# Patient Record
Sex: FEMALE | Employment: UNEMPLOYED | URBAN - METROPOLITAN AREA
[De-identification: names, ages, dates, MRNs, and addresses within clinical notes are randomized per-mention and may not be internally consistent; named-entity substitution may affect disease eponyms.]

---

## 2017-05-07 ENCOUNTER — HOSPITAL ENCOUNTER (OUTPATIENT)
Age: 32
Discharge: HOME | End: 2017-05-07
Payer: COMMERCIAL

## 2017-05-07 VITALS
DIASTOLIC BLOOD PRESSURE: 65 MMHG | HEIGHT: 62 IN | SYSTOLIC BLOOD PRESSURE: 99 MMHG | TEMPERATURE: 98 F | WEIGHT: 130 LBS | BODY MASS INDEX: 23.92 KG/M2 | RESPIRATION RATE: 20 BRPM | HEART RATE: 78 BPM | OXYGEN SATURATION: 99 %

## 2017-05-07 DIAGNOSIS — T30.0 SUPERFICIAL BURN: Primary | ICD-10-CM

## 2017-05-07 PROCEDURE — 99202 OFFICE O/P NEW SF 15 MIN: CPT

## 2017-05-07 NOTE — ED PROVIDER NOTES
Patient Seen in: [address]    History   Patient presents with:  Trauma (cardiovascular, musculoskeletal)    Stated Complaint: right hand burn     HPI    27year old female complains of burning right dorsal hand and wrist Eyes: Conjunctivae and lids are normal. Right conjunctiva is not injected. Left conjunctiva is not injected. No scleral icterus. Pupils are equal.   Neck: Normal range of motion and phonation normal. Neck supple. No JVD present.    Pulmonary/Chest: Effort n Disposition and Plan     Clinical Impression:  Superficial burn  (primary encounter diagnosis)    Disposition:  Discharge    Follow-up:      Schedule an appointment as soon as possible for a visit      MD Amos Moura 13  [address] [place] [address]

## 2017-09-25 PROCEDURE — 88175 CYTOPATH C/V AUTO FLUID REDO: CPT | Performed by: OBSTETRICS & GYNECOLOGY

## 2020-06-08 ENCOUNTER — OFFICE VISIT (OUTPATIENT)
Dept: OBGYN CLINIC | Facility: CLINIC | Age: 35
End: 2020-06-08
Payer: COMMERCIAL

## 2020-06-08 VITALS
SYSTOLIC BLOOD PRESSURE: 102 MMHG | TEMPERATURE: 98 F | HEART RATE: 76 BPM | DIASTOLIC BLOOD PRESSURE: 78 MMHG | HEIGHT: 62 IN | RESPIRATION RATE: 14 BRPM | BODY MASS INDEX: 22.26 KG/M2 | WEIGHT: 121 LBS

## 2020-06-08 DIAGNOSIS — Z32.00 ENCOUNTER FOR PREGNANCY TEST, RESULT UNKNOWN: ICD-10-CM

## 2020-06-08 DIAGNOSIS — Z64.0 UNWANTED PREGNANCY WITH PLANS FOR TERMINATION: Primary | ICD-10-CM

## 2020-06-08 DIAGNOSIS — Z30.09 EVALUATION REGARDING CONTRACEPTION OPTIONS: ICD-10-CM

## 2020-06-08 PROCEDURE — 99202 OFFICE O/P NEW SF 15 MIN: CPT | Performed by: OBSTETRICS & GYNECOLOGY

## 2020-06-08 PROCEDURE — 81025 URINE PREGNANCY TEST: CPT | Performed by: OBSTETRICS & GYNECOLOGY

## 2020-06-09 NOTE — PROGRESS NOTES
HPI:   Gracie Barker is a 38year old female presents for consultation regarding  Termination of pregnancy. She and  has already decided. LMP 5/12/20. Unsure. By LMP is 5+ weeks pregnant. No spotting or cramping.  Considering [facility] exactly [facility]    3. Evaluation regarding contraception options  IUD, vasectomy nexplanon depo       Plan:  F/U after termination.

## 2020-10-05 ENCOUNTER — OFFICE VISIT (OUTPATIENT)
Dept: OBGYN CLINIC | Facility: CLINIC | Age: 35
End: 2020-10-05
Payer: COMMERCIAL

## 2020-10-05 VITALS
DIASTOLIC BLOOD PRESSURE: 50 MMHG | WEIGHT: 125 LBS | HEIGHT: 62 IN | SYSTOLIC BLOOD PRESSURE: 98 MMHG | BODY MASS INDEX: 23 KG/M2

## 2020-10-05 DIAGNOSIS — N89.8 VAGINAL DISCHARGE: ICD-10-CM

## 2020-10-05 DIAGNOSIS — N89.8 VAGINAL ITCHING: Primary | ICD-10-CM

## 2020-10-05 PROCEDURE — 3078F DIAST BP <80 MM HG: CPT | Performed by: OBSTETRICS & GYNECOLOGY

## 2020-10-05 PROCEDURE — 87660 TRICHOMONAS VAGIN DIR PROBE: CPT | Performed by: OBSTETRICS & GYNECOLOGY

## 2020-10-05 PROCEDURE — 3074F SYST BP LT 130 MM HG: CPT | Performed by: OBSTETRICS & GYNECOLOGY

## 2020-10-05 PROCEDURE — 99213 OFFICE O/P EST LOW 20 MIN: CPT | Performed by: OBSTETRICS & GYNECOLOGY

## 2020-10-05 PROCEDURE — 87510 GARDNER VAG DNA DIR PROBE: CPT | Performed by: OBSTETRICS & GYNECOLOGY

## 2020-10-05 PROCEDURE — 3008F BODY MASS INDEX DOCD: CPT | Performed by: OBSTETRICS & GYNECOLOGY

## 2020-10-05 PROCEDURE — 87480 CANDIDA DNA DIR PROBE: CPT | Performed by: OBSTETRICS & GYNECOLOGY

## 2020-10-05 NOTE — PROGRESS NOTES
HPI:    Patient ID: Pedro Kerns is a 38year old female. HPI  Patient presents with vulvar itching and vaginal discharge that she finds around the urethral area. She is  in denies possibility of STDs.   She has not used any new products in the [employer] can use over-the-counter Monistat and apply it to the external genitalia. Will wait for the culture to come back to assess type of infection she may have.   Meds This Visit:  Requested Prescriptions      No prescriptions requested or ordered in this encoun

## 2020-10-06 RX ORDER — METRONIDAZOLE 500 MG/1
500 TABLET ORAL 2 TIMES DAILY
Qty: 14 TABLET | Refills: 0 | Status: SHIPPED | OUTPATIENT
Start: 2020-10-06 | End: 2020-10-13

## 2020-10-16 PROBLEM — Z64.0 UNWANTED PREGNANCY WITH PLANS FOR TERMINATION: Status: RESOLVED | Noted: 2020-06-08 | Resolved: 2020-10-16

## 2020-10-16 PROBLEM — Z30.09 EVALUATION REGARDING CONTRACEPTION OPTIONS: Status: RESOLVED | Noted: 2020-06-08 | Resolved: 2020-10-16

## 2021-04-15 PROBLEM — M54.32 SCIATICA, LEFT SIDE: Status: ACTIVE | Noted: 2021-04-15

## 2021-04-15 PROBLEM — M94.0 COSTOCHONDRITIS: Status: ACTIVE | Noted: 2021-04-15

## 2021-04-15 PROBLEM — R07.9 CHEST PAIN, UNSPECIFIED TYPE: Status: ACTIVE | Noted: 2021-04-15

## (undated) NOTE — ED AVS SNAPSHOT
[facility] in [address] [address]    Phone:  [phone]    Fax:  [phone]           Chester Yung   MRN: [id number]    Department:  [facility] in [facility]   Date of Visit:  5/7/201 aspect of your visit today. In an effort to constantly improve our service to you, we would appreciate any positive or negative feedback related to the care you received in our Immediate Care. Please call our [address] at [phone].   Your Immediate contact you. Please make sure we have your correct phone number on file.      OUR CURRENT HOURS OF OPERATION:  [address] [facility] [place], operated by [facility]  WEEKENDS AND HOLIDAYS 8AM - 6PM    I certified that I have received a copy of the aftercare instructions; that t [employer] will allow you to access patient instructions from your recent visit,  view other health information, and more. To sign up or find more information, go to [email or web address] and click on the Sign Up Now link in the [employer] box.      Enter